# Patient Record
Sex: MALE | Race: OTHER | NOT HISPANIC OR LATINO | URBAN - METROPOLITAN AREA
[De-identification: names, ages, dates, MRNs, and addresses within clinical notes are randomized per-mention and may not be internally consistent; named-entity substitution may affect disease eponyms.]

---

## 2021-12-04 ENCOUNTER — EMERGENCY (EMERGENCY)
Facility: HOSPITAL | Age: 61
LOS: 1 days | Discharge: HOME | End: 2021-12-04
Attending: STUDENT IN AN ORGANIZED HEALTH CARE EDUCATION/TRAINING PROGRAM
Payer: COMMERCIAL

## 2021-12-04 ENCOUNTER — TRANSCRIPTION ENCOUNTER (OUTPATIENT)
Age: 61
End: 2021-12-04

## 2021-12-04 VITALS
RESPIRATION RATE: 18 BRPM | DIASTOLIC BLOOD PRESSURE: 81 MMHG | TEMPERATURE: 103 F | SYSTOLIC BLOOD PRESSURE: 125 MMHG | HEART RATE: 95 BPM | OXYGEN SATURATION: 95 %

## 2021-12-04 LAB
ALBUMIN SERPL ELPH-MCNC: 3.7 G/DL — SIGNIFICANT CHANGE UP (ref 3.5–5)
ALP SERPL-CCNC: 41 U/L — SIGNIFICANT CHANGE UP (ref 40–120)
ALT FLD-CCNC: 23 U/L DA — SIGNIFICANT CHANGE UP (ref 10–60)
ANION GAP SERPL CALC-SCNC: 9 MMOL/L — SIGNIFICANT CHANGE UP (ref 5–17)
APTT BLD: 33.3 SEC — SIGNIFICANT CHANGE UP (ref 27.5–35.5)
AST SERPL-CCNC: 19 U/L — SIGNIFICANT CHANGE UP (ref 10–40)
BASOPHILS # BLD AUTO: 0.03 K/UL — SIGNIFICANT CHANGE UP (ref 0–0.2)
BASOPHILS NFR BLD AUTO: 0.2 % — SIGNIFICANT CHANGE UP (ref 0–2)
BILIRUB SERPL-MCNC: 0.9 MG/DL — SIGNIFICANT CHANGE UP (ref 0.2–1.2)
BUN SERPL-MCNC: 22 MG/DL — HIGH (ref 7–18)
CALCIUM SERPL-MCNC: 9 MG/DL — SIGNIFICANT CHANGE UP (ref 8.4–10.5)
CHLORIDE SERPL-SCNC: 100 MMOL/L — SIGNIFICANT CHANGE UP (ref 96–108)
CO2 SERPL-SCNC: 26 MMOL/L — SIGNIFICANT CHANGE UP (ref 22–31)
CREAT SERPL-MCNC: 1.14 MG/DL — SIGNIFICANT CHANGE UP (ref 0.5–1.3)
EOSINOPHIL # BLD AUTO: 0.05 K/UL — SIGNIFICANT CHANGE UP (ref 0–0.5)
EOSINOPHIL NFR BLD AUTO: 0.3 % — SIGNIFICANT CHANGE UP (ref 0–6)
FLUAV H1 2009 PAND RNA SPEC QL NAA+PROBE: DETECTED
GLUCOSE SERPL-MCNC: 139 MG/DL — HIGH (ref 70–99)
HCT VFR BLD CALC: 46.1 % — SIGNIFICANT CHANGE UP (ref 39–50)
HGB BLD-MCNC: 15.7 G/DL — SIGNIFICANT CHANGE UP (ref 13–17)
IMM GRANULOCYTES NFR BLD AUTO: 0.4 % — SIGNIFICANT CHANGE UP (ref 0–1.5)
INR BLD: 1.2 RATIO — HIGH (ref 0.88–1.16)
LACTATE SERPL-SCNC: 1.2 MMOL/L — SIGNIFICANT CHANGE UP (ref 0.7–2)
LYMPHOCYTES # BLD AUTO: 0.98 K/UL — LOW (ref 1–3.3)
LYMPHOCYTES # BLD AUTO: 6.5 % — LOW (ref 13–44)
MCHC RBC-ENTMCNC: 30.5 PG — SIGNIFICANT CHANGE UP (ref 27–34)
MCHC RBC-ENTMCNC: 34.1 GM/DL — SIGNIFICANT CHANGE UP (ref 32–36)
MCV RBC AUTO: 89.5 FL — SIGNIFICANT CHANGE UP (ref 80–100)
MONOCYTES # BLD AUTO: 1.24 K/UL — HIGH (ref 0–0.9)
MONOCYTES NFR BLD AUTO: 8.2 % — SIGNIFICANT CHANGE UP (ref 2–14)
NEUTROPHILS # BLD AUTO: 12.79 K/UL — HIGH (ref 1.8–7.4)
NEUTROPHILS NFR BLD AUTO: 84.4 % — HIGH (ref 43–77)
NRBC # BLD: 0 /100 WBCS — SIGNIFICANT CHANGE UP (ref 0–0)
PLATELET # BLD AUTO: 188 K/UL — SIGNIFICANT CHANGE UP (ref 150–400)
POTASSIUM SERPL-MCNC: 3.7 MMOL/L — SIGNIFICANT CHANGE UP (ref 3.5–5.3)
POTASSIUM SERPL-SCNC: 3.7 MMOL/L — SIGNIFICANT CHANGE UP (ref 3.5–5.3)
PROT SERPL-MCNC: 7.5 G/DL — SIGNIFICANT CHANGE UP (ref 6–8.3)
PROTHROM AB SERPL-ACNC: 14.2 SEC — HIGH (ref 10.6–13.6)
RAPID RVP RESULT: DETECTED
RBC # BLD: 5.15 M/UL — SIGNIFICANT CHANGE UP (ref 4.2–5.8)
RBC # FLD: 12.2 % — SIGNIFICANT CHANGE UP (ref 10.3–14.5)
SARS-COV-2 RNA SPEC QL NAA+PROBE: SIGNIFICANT CHANGE UP
SODIUM SERPL-SCNC: 135 MMOL/L — SIGNIFICANT CHANGE UP (ref 135–145)
WBC # BLD: 15.15 K/UL — HIGH (ref 3.8–10.5)
WBC # FLD AUTO: 15.15 K/UL — HIGH (ref 3.8–10.5)

## 2021-12-04 PROCEDURE — 85025 COMPLETE CBC W/AUTO DIFF WBC: CPT

## 2021-12-04 PROCEDURE — 99284 EMERGENCY DEPT VISIT MOD MDM: CPT | Mod: 25

## 2021-12-04 PROCEDURE — 85730 THROMBOPLASTIN TIME PARTIAL: CPT

## 2021-12-04 PROCEDURE — 71045 X-RAY EXAM CHEST 1 VIEW: CPT

## 2021-12-04 PROCEDURE — 71045 X-RAY EXAM CHEST 1 VIEW: CPT | Mod: 26

## 2021-12-04 PROCEDURE — 85610 PROTHROMBIN TIME: CPT

## 2021-12-04 PROCEDURE — 80053 COMPREHEN METABOLIC PANEL: CPT

## 2021-12-04 PROCEDURE — 0225U NFCT DS DNA&RNA 21 SARSCOV2: CPT

## 2021-12-04 PROCEDURE — 36415 COLL VENOUS BLD VENIPUNCTURE: CPT

## 2021-12-04 PROCEDURE — 96374 THER/PROPH/DIAG INJ IV PUSH: CPT

## 2021-12-04 PROCEDURE — 99285 EMERGENCY DEPT VISIT HI MDM: CPT

## 2021-12-04 PROCEDURE — 87040 BLOOD CULTURE FOR BACTERIA: CPT

## 2021-12-04 PROCEDURE — 83605 ASSAY OF LACTIC ACID: CPT

## 2021-12-04 RX ORDER — ACETAMINOPHEN 500 MG
650 TABLET ORAL ONCE
Refills: 0 | Status: COMPLETED | OUTPATIENT
Start: 2021-12-04 | End: 2021-12-04

## 2021-12-04 RX ORDER — SODIUM CHLORIDE 9 MG/ML
1000 INJECTION INTRAMUSCULAR; INTRAVENOUS; SUBCUTANEOUS ONCE
Refills: 0 | Status: COMPLETED | OUTPATIENT
Start: 2021-12-04 | End: 2021-12-04

## 2021-12-04 RX ADMIN — Medication 650 MILLIGRAM(S): at 21:22

## 2021-12-04 RX ADMIN — SODIUM CHLORIDE 1000 MILLILITER(S): 9 INJECTION INTRAMUSCULAR; INTRAVENOUS; SUBCUTANEOUS at 21:23

## 2021-12-04 NOTE — ED PROVIDER NOTE - NS ED ROS FT
+ fevers, chills  + malaise, body aches  (-) neck pain, stiffness  (-) cp, palpitations  (-) sob, cough, hemoptysis  (-) abd pain, n/v/d/c   (-) urinary sxs, back pain  (-) weakness, paresthesias

## 2021-12-04 NOTE — ED PROVIDER NOTE - PATIENT PORTAL LINK FT
You can access the FollowMyHealth Patient Portal offered by [facility] by registering at the following website: [email or web address]. By joining [facility]’s FollowMyHealth portal, you will also be able to view your health information using other applications (apps) compatible with our system.

## 2021-12-04 NOTE — ED PROVIDER NOTE - OBJECTIVE STATEMENT
60yo M p/w nasal congestion, headache, fatigue, body aches for past week. patient states that his son was ill with similar symptoms 1 week ago and patient began to ecxperience similar sxs the following day. patient states that he went to urgent care 4 days ago and had negative covid test. patient states that sxs have persisted longer than he would have thought for a cold, so he came to the ed. denies syncope, cp, sob, abd pain, back pain, n/v/d/c otherwise. Vaccinated against covid.

## 2021-12-04 NOTE — ED PROVIDER NOTE - PROGRESS NOTE DETAILS
influenza positive  more than 5 days out therefore no indication tamiflu at this time  pt feels improved after meds, ivf  dc w hydration encouragement, pcp f/up in 1 week. education and expectation management given.

## 2021-12-04 NOTE — ED ADULT TRIAGE NOTE - CHIEF COMPLAINT QUOTE
He has shortness of breath, fever, headache, generalized weakness.  Had negative COVID PCR, flu tests done today

## 2021-12-04 NOTE — ED PROVIDER NOTE - CLINICAL SUMMARY MEDICAL DECISION MAKING FREE TEXT BOX
pt p/w fatigue, body aches, f/c  sepsis protocol in ed  high siuspicion viral etiology as pt had sick contacts w same sxs at home and myriad of body sxs generally a/w viral etiology  labs, ivf, xr, rvp, reassess

## 2021-12-04 NOTE — ED PROVIDER NOTE - PHYSICAL EXAMINATION
Sohrawardy:   VS reviewed  aaoxx3  rrr, s1s2, no jvd, no pitting edema  normal resp effort  lungs ctab, no w/r/r  abdomen soft, nd/nt   no neuro deficits

## 2021-12-10 LAB
CULTURE RESULTS: SIGNIFICANT CHANGE UP
CULTURE RESULTS: SIGNIFICANT CHANGE UP
SPECIMEN SOURCE: SIGNIFICANT CHANGE UP
SPECIMEN SOURCE: SIGNIFICANT CHANGE UP

## 2022-11-21 PROBLEM — Z00.00 ENCOUNTER FOR PREVENTIVE HEALTH EXAMINATION: Status: ACTIVE | Noted: 2022-11-21

## 2022-11-30 ENCOUNTER — APPOINTMENT (OUTPATIENT)
Dept: UROLOGY | Facility: CLINIC | Age: 62
End: 2022-11-30

## 2022-11-30 VITALS
DIASTOLIC BLOOD PRESSURE: 87 MMHG | OXYGEN SATURATION: 99 % | HEIGHT: 66 IN | WEIGHT: 189 LBS | BODY MASS INDEX: 30.37 KG/M2 | SYSTOLIC BLOOD PRESSURE: 132 MMHG | HEART RATE: 70 BPM | TEMPERATURE: 97.8 F

## 2022-11-30 DIAGNOSIS — Z87.891 PERSONAL HISTORY OF NICOTINE DEPENDENCE: ICD-10-CM

## 2022-11-30 PROCEDURE — 99204 OFFICE O/P NEW MOD 45 MIN: CPT

## 2022-11-30 RX ORDER — METFORMIN HYDROCHLORIDE 500 MG/1
500 TABLET, COATED ORAL
Qty: 60 | Refills: 0 | Status: ACTIVE | COMMUNITY
Start: 2022-09-20

## 2022-11-30 RX ORDER — GLYBURIDE AND METFORMIN HYDROCHLORIDE 2.5; 5 MG/1; MG/1
2.5-5 TABLET, FILM COATED ORAL
Qty: 60 | Refills: 0 | Status: ACTIVE | COMMUNITY
Start: 2022-11-26

## 2022-11-30 RX ORDER — METHYLPREDNISOLONE 4 MG/1
4 TABLET ORAL
Qty: 21 | Refills: 0 | Status: ACTIVE | COMMUNITY
Start: 2022-06-22

## 2022-11-30 RX ORDER — HYDROCORTISONE 25 MG/G
2.5 CREAM TOPICAL
Qty: 30 | Refills: 0 | Status: ACTIVE | COMMUNITY
Start: 2022-09-20

## 2022-11-30 NOTE — HISTORY OF PRESENT ILLNESS
[Currently Experiencing ___] :  [unfilled] [Erectile Dysfunction] : Erectile Dysfunction [None] : None [FreeTextEntry1] : Mr. Duran is a very pleasant 62 year old man here today for ED.\par He reports that he is able to get an erection but it looses strength rapidly.\par Reports he has a desire for intercourse; reports some anxiety.\par Reports that he has tried Viagra before with success.\par Reports DM; last HgbA1c 8%.\par Denies any cardiovascular disease or chest pain.\par Denies any hematuria, dysuria or urinary retention.\par Denies any family history of urological cancers.\par Former smoker; quit smoking 30 years ago.\par

## 2022-11-30 NOTE — ASSESSMENT
[FreeTextEntry1] : Mr. Duran is a very pleasant 62 year old man here today for ED.\par \par -We had an extensive discussion regarding possible management options for erectile dysfunction, including PDE 5 inhibitors, SID, ICI, intraurethral alprostadil, penile prosthesis\par -After a thorough discussion of the risks and benefits of the aforementioned options he would like to try a trial of a PDE 5 inhibitor\par -Trial of Cialis\par -Discussed the R/B/A of Cialis at length, including priapism\par -A1c\par -BMP\par -PSA\par -F/U in 1 month

## 2022-12-01 LAB
ANION GAP SERPL CALC-SCNC: 10 MMOL/L
BUN SERPL-MCNC: 18 MG/DL
CALCIUM SERPL-MCNC: 9.9 MG/DL
CHLORIDE SERPL-SCNC: 91 MMOL/L
CO2 SERPL-SCNC: 29 MMOL/L
CREAT SERPL-MCNC: 1.02 MG/DL
EGFR: 82 ML/MIN/1.73M2
ESTIMATED AVERAGE GLUCOSE: 163 MG/DL
GLUCOSE SERPL-MCNC: 182 MG/DL
HBA1C MFR BLD HPLC: 7.3 %
POTASSIUM SERPL-SCNC: 4.5 MMOL/L
PSA SERPL-MCNC: 0.74 NG/ML
SODIUM SERPL-SCNC: 130 MMOL/L

## 2023-01-03 ENCOUNTER — APPOINTMENT (OUTPATIENT)
Dept: UROLOGY | Facility: CLINIC | Age: 63
End: 2023-01-03

## 2023-03-15 ENCOUNTER — APPOINTMENT (OUTPATIENT)
Dept: UROLOGY | Facility: CLINIC | Age: 63
End: 2023-03-15
Payer: COMMERCIAL

## 2023-03-15 VITALS
HEIGHT: 66 IN | BODY MASS INDEX: 29.73 KG/M2 | SYSTOLIC BLOOD PRESSURE: 134 MMHG | OXYGEN SATURATION: 97 % | TEMPERATURE: 97.4 F | DIASTOLIC BLOOD PRESSURE: 85 MMHG | HEART RATE: 87 BPM | WEIGHT: 185 LBS

## 2023-03-15 DIAGNOSIS — N52.9 MALE ERECTILE DYSFUNCTION, UNSPECIFIED: ICD-10-CM

## 2023-03-15 DIAGNOSIS — Z12.5 ENCOUNTER FOR SCREENING FOR MALIGNANT NEOPLASM OF PROSTATE: ICD-10-CM

## 2023-03-15 PROCEDURE — 99214 OFFICE O/P EST MOD 30 MIN: CPT

## 2023-03-15 RX ORDER — TADALAFIL 5 MG/1
5 TABLET ORAL
Qty: 20 | Refills: 0 | Status: DISCONTINUED | COMMUNITY
Start: 2022-11-30 | End: 2023-03-15

## 2023-03-15 RX ORDER — TADALAFIL 10 MG/1
10 TABLET, FILM COATED ORAL
Qty: 30 | Refills: 0 | Status: ACTIVE | COMMUNITY
Start: 2023-03-15 | End: 1900-01-01

## 2023-03-15 NOTE — ASSESSMENT
[FreeTextEntry1] : Very pleasant 63-year-old gentleman who presents for follow-up of BPH, screening for prostate cancer\par -PSA 0.74\par -Creatinine 1.02\par -A1c 7.3%.  We discussed how this may contribute to erectile dysfunction\par -Increase dose of Cialis to 10 mg; prescription sent to the pharmacy\par -I discussed the risks, benefits, alternatives, and possible side effects of Cialis (tadalafil) therapy with the patient, including but not limited to headache, flushing, upset stomach, blurry vision, change in color vision, vision loss, and priapism with the patient.\par -Follow-up in 6 months or sooner if necessary\par

## 2023-03-15 NOTE — HISTORY OF PRESENT ILLNESS
[FreeTextEntry1] : Very pleasant 63-year-old gentleman who presents for follow-up of erectile dysfunction, screening for prostate cancer.  He feels well.  He reports that Cialis 5 mg improves his erections, however he wishes to try a stronger dose of the med patient is he reports a loss of rigidity and tumescence while having sexual intercourse.  He denies dysuria.  No hematuria.  No side effects from the medication.

## 2023-03-27 NOTE — ED PROVIDER NOTE - WR ORDER NAME 1
Xray Chest 1 View- PORTABLE-Urgent [place] Pregnancy And Lactation Text: This medication is not safe during pregnancy. It is unknown if this medication is excreted in breast milk.

## 2024-05-08 ENCOUNTER — APPOINTMENT (OUTPATIENT)
Dept: ORTHOPEDIC SURGERY | Facility: CLINIC | Age: 64
End: 2024-05-08
Payer: COMMERCIAL

## 2024-05-08 DIAGNOSIS — Z86.39 PERSONAL HISTORY OF OTHER ENDOCRINE, NUTRITIONAL AND METABOLIC DISEASE: ICD-10-CM

## 2024-05-08 PROCEDURE — 99204 OFFICE O/P NEW MOD 45 MIN: CPT

## 2024-05-08 PROCEDURE — 72110 X-RAY EXAM L-2 SPINE 4/>VWS: CPT

## 2024-05-08 RX ORDER — CYCLOBENZAPRINE HYDROCHLORIDE 5 MG/1
5 TABLET, FILM COATED ORAL 3 TIMES DAILY
Qty: 30 | Refills: 1 | Status: ACTIVE | COMMUNITY
Start: 2024-05-08 | End: 1900-01-01

## 2024-05-08 RX ORDER — MELOXICAM 15 MG/1
15 TABLET ORAL
Qty: 30 | Refills: 1 | Status: ACTIVE | COMMUNITY
Start: 2024-05-08 | End: 1900-01-01

## 2024-05-16 ENCOUNTER — APPOINTMENT (OUTPATIENT)
Dept: ORTHOPEDIC SURGERY | Facility: CLINIC | Age: 64
End: 2024-05-16
Payer: COMMERCIAL

## 2024-05-16 ENCOUNTER — TRANSCRIPTION ENCOUNTER (OUTPATIENT)
Age: 64
End: 2024-05-16

## 2024-05-16 VITALS — HEIGHT: 66 IN | WEIGHT: 185 LBS | BODY MASS INDEX: 29.73 KG/M2

## 2024-05-16 DIAGNOSIS — M17.11 UNILATERAL PRIMARY OSTEOARTHRITIS, RIGHT KNEE: ICD-10-CM

## 2024-05-16 PROCEDURE — 20610 DRAIN/INJ JOINT/BURSA W/O US: CPT | Mod: RT

## 2024-05-16 PROCEDURE — 73562 X-RAY EXAM OF KNEE 3: CPT | Mod: RT

## 2024-05-16 PROCEDURE — 99213 OFFICE O/P EST LOW 20 MIN: CPT | Mod: 25

## 2024-05-16 NOTE — PHYSICAL EXAM
[de-identified] : Right knee  Constitutional:  The patient is healthy-appearing and in no apparent distress.   Gait: The patient ambulates with a normal gait and no limp.  Cardiovascular System:  The capillary refill is less than 2 seconds.   Skin:  There are no skin abnormalities.  Right Knee:   Bony Palpation:  There is tenderness of the medial joint line.  There is no tenderness of the lateral joint line. There is tenderness of the medial femoral chondyle. There is no tenderness of the lateral femoral chondyle. There is no tenderness of the tibial tubercle. There is no tenderness of the superior patella. There is no tenderness of the inferior patella. There is tenderness of the medial patellar facet. There is tenderness of the lateral patellar facet.  Soft Tissue Palpation:  There is no tenderness of the medial retinaculum. There is no tenderness of the lateral retinaculum. There is no tenderness of the quadriceps tendon. There is no tenderness of the patella tendon. There is no tenderness of the ITB. There is no tenderness of the pes anserine.  Active Range of Motion:  The range of motion at the knee actively and passively is full.   Special Tests:  There is a negative Apley. There is a negative Steinmanns.  There is a negative Lachman and Anterior Drawer. There is a negative Posterior Drawer.   There is no varus or valgus laxity.  Strength:  There is 5/5 hip flexion and 5/5 knee flexion and extension.    Psychiatric:  The patient demonstrates a normal mood and affect and is active and alert   [de-identified] : X-ray right knee: There is moderate tricompartmental arthritis

## 2024-05-16 NOTE — ASSESSMENT
[FreeTextEntry1] : Discussed at length with patient exam history and imaging and underlying arthritis he elects cortisone injection today as well as home exercises and declines any formal physical therapy
4

## 2024-05-16 NOTE — HISTORY OF PRESENT ILLNESS
[de-identified] : Initial visit: Right  knee pain  Reason: possible torn meniscus  -  from 2007 Duration:  unsure  Prior studies: x rays  Symptoms: sharp pain  Aggravating: up and down latter  Alleviating: massage  Pain level: 4/10 Pain medication: topical cream and rubs Medical Hx: Diabetes  Surgical Hx: n/a Current Medication: glyburide  Allergies: Amoxicillin  Physical therapy - finished several series of   physical therapy

## 2024-05-16 NOTE — PROCEDURE

## 2024-05-18 NOTE — HISTORY OF PRESENT ILLNESS
[de-identified] : 63 y/o male presenting with lower back pain x 3 years. Patient's pain has been intermittent but is recently worsening. Pain is localized to lower back and does not radiate down his legs. He has done PT with some relief. He had an MRI in 2021 and an epidural injection which provided excellent relief. He would like another injection.  denies radicular pain, recent illness, fevers, numbness, weakness, balance problems, saddle anesthesia, urinary retention or fecal incontinence.

## 2024-05-18 NOTE — PHYSICAL EXAM
[de-identified] :  General: No acute distress, conversant, well-nourished. Head: Normocephalic, atraumatic Neck: trachea midline, FROM Heart: normotensive and normal rate and rhythm Lungs: No labored breathing Skin: No abrasions, no rashes, no edema Psych: Alert and oriented to person, place and time Extremities: no peripheral edema or digital cyanosis Gait: Normal gait. Can perform tandem gait.  Vascular: warm and well perfused distally, palpable distal pulses    MSK: Lumbar spine: + tenderness to palpation.  No step-off, no deformity.   NEURO EXAM: Sensation Left L2  -  2/2            Left L3  -  2/2 Left L4  -  2/2 Left L5  -  2/2 Left S1  -  2/2   Right L2  -  2/2            Right L3  -  2/2 Right L4  -  2/2 Right L5  -  2/2 Right S1  -  2/2   Motor: Left L2 (hip flexion)                            5/5                Left L3 (knee extension)                   5/5                Left L4 (ankle dorsiflexion)                 5/5                Left L5 (long toe extensor)                5/5                Left S1 (ankle plantar flexion)           5/5   Right L2 (hip flexion)                            5/5                Right L3 (knee extension)                   5/5                Right L4 (ankle dorsiflexion)                 5/5                Right L5 (long toe extensor)                5/5                Right S1 (ankle plantar flexion)           5/5   Reflexes: Normal and symmetric Negative clonus.  Down-going Babinski. [de-identified] : I ordered radiographs to evaluate the patient's symptoms.  Lumbar 4 view radiographs taken in the office today show no dislocation or fracture.  Lumbar spondylosis.  No instability on dynamic series. Mild scoliosis.

## 2024-05-18 NOTE — ASSESSMENT
[FreeTextEntry1] : 65 y/o male presenting with lower back pain x 3 years. Patient's pain has been intermittent but is recently worsening. Pain is localized to lower back and does not radiate down his legs. He has done PT with some relief. He had an MRI in 2021 and an epidural injection which provided excellent relief. He would like another injection.  denies radicular pain, recent illness, fevers, numbness, weakness, balance problems, saddle anesthesia, urinary retention or fecal incontinence.  Patient will be sent for a MRI. The patient was given a referral for physical therapy. Start meloxicam. Start cyclobenzaprine. Follow up after MRI. We discussed red flag symptoms that would require emergent evaluation.  He knows to call with any questions or concerns or if his symptoms acutely worsen.

## 2024-05-30 ENCOUNTER — APPOINTMENT (OUTPATIENT)
Dept: PHYSICAL MEDICINE AND REHAB | Facility: CLINIC | Age: 64
End: 2024-05-30
Payer: COMMERCIAL

## 2024-05-30 ENCOUNTER — APPOINTMENT (OUTPATIENT)
Dept: ORTHOPEDIC SURGERY | Facility: CLINIC | Age: 64
End: 2024-05-30
Payer: COMMERCIAL

## 2024-05-30 DIAGNOSIS — M54.50 LOW BACK PAIN, UNSPECIFIED: ICD-10-CM

## 2024-05-30 PROCEDURE — 99203 OFFICE O/P NEW LOW 30 MIN: CPT

## 2024-05-30 PROCEDURE — 99214 OFFICE O/P EST MOD 30 MIN: CPT

## 2024-05-30 NOTE — HISTORY OF PRESENT ILLNESS
[de-identified] : 63 y/o male presenting with lower back pain x 3 years. Patient's pain has been intermittent but is recently worsening. Pain is localized to lower back and does not radiate down his legs. He has done PT with some relief. He had an MRI in 2021 and an epidural injection which provided excellent relief. He would like another injection. denies radicular pain, recent illness, fevers, numbness, weakness, balance problems, saddle anesthesia, urinary retention or fecal incontinence. Patient following up today to review lumbar MRI. His pain remains the same.

## 2024-05-30 NOTE — ASSESSMENT
[FreeTextEntry1] : 63 y/o male presenting with lower back pain x 3 years. Patient's pain has been intermittent but is recently worsening. Pain is localized to lower back and does not radiate down his legs. He has done PT with some relief. He had an MRI in 2021 and an epidural injection which provided excellent relief. He would like another injection. denies radicular pain, recent illness, fevers, numbness, weakness, balance problems, saddle anesthesia, urinary retention or fecal incontinence. Patient following up today to review lumbar MRI. His pain remains the same. I independently reviewed lumbar MRI which shows L4-L5 disc herniation contributing to spinal canal stenosis. The patient was given a referral for consideration for spinal injections. Continue PT. Follow up in 3-4 weeks. We discussed red flag symptoms that would require emergent evaluation.

## 2024-05-30 NOTE — PHYSICAL EXAM
[de-identified] :  General: No acute distress, conversant, well-nourished. Head: Normocephalic, atraumatic Neck: trachea midline, FROM Heart: normotensive and normal rate and rhythm Lungs: No labored breathing Skin: No abrasions, no rashes, no edema Psych: Alert and oriented to person, place and time Extremities: no peripheral edema or digital cyanosis Gait: Normal gait. Can perform tandem gait.  Vascular: warm and well perfused distally, palpable distal pulses    MSK: Lumbar spine: + tenderness to palpation.  No step-off, no deformity.   NEURO EXAM: Sensation Left L2  -  2/2            Left L3  -  2/2 Left L4  -  2/2 Left L5  -  2/2 Left S1  -  2/2   Right L2  -  2/2            Right L3  -  2/2 Right L4  -  2/2 Right L5  -  2/2 Right S1  -  2/2   Motor: Left L2 (hip flexion)                            5/5                Left L3 (knee extension)                   5/5                Left L4 (ankle dorsiflexion)                 5/5                Left L5 (long toe extensor)                5/5                Left S1 (ankle plantar flexion)           5/5   Right L2 (hip flexion)                            5/5                Right L3 (knee extension)                   5/5                Right L4 (ankle dorsiflexion)                 5/5                Right L5 (long toe extensor)                5/5                Right S1 (ankle plantar flexion)           5/5   Reflexes: Normal and symmetric Negative clonus.  Down-going Babinski. [de-identified] : I independently reviewed MRI of the lumbar spine which showed degenerative changes including L4-L5 disc herniation with stenosis.    05/22/2024  L3-L4: Moderate diffuse disc bulging contributing to mild spinal canal stenosis and moderate foraminal stenosis bilaterally.  L4-L5: Inferiorly extruded central/right paracentral disc herniation superimposed on severe diffuse disc bulging contributing to severe spinal canal stenosis, impingement of the traversing L5 nerve roots in the lateral recesses bilaterally, and moderate to severe foraminal stenosis bilaterally.  L5-S1: Central disc protrusion superimposed on moderate diffuse disc bulging contributing to mild spinal canal stenosis, impingement of the traversing S1 nerve roots in the lateral recesses bilaterally, and moderate foraminal stenosis bilaterally.

## 2024-05-31 NOTE — ASSESSMENT
[FreeTextEntry1] : MRI shows L4/5 HNP causing severe stenosis, L5/S1 HNP pinching S1.  Discussed diagnosis and treatment plan including PT. He has tried conservative tx including PT, nsaids for past 3 months without relief. Discussed LISA in detail.  Risks include bleeding.  Stop nsaids 2 days before.   He will continue a comprehensive rehabilitation program afterward, as this is important to prevent recurrence of pain.

## 2024-05-31 NOTE — HISTORY OF PRESENT ILLNESS
[FreeTextEntry1] : Location: back Severity: 10/10 Duration: over 3 yr Context:  Aggravating Factors: getting up, walking Alleviating Factors: HEP, LISA 80% relief for 1 yr Associated Symptoms: denies weight loss, fever, chills, change in bowel/bladder habits, weakness, numbness/tingling, +radiation down right leg Prior Studies: MRI

## 2024-05-31 NOTE — PHYSICAL EXAM
[FreeTextEntry1] : GEN is a 64 year old male  Constitutional: healthy appearing, NAD, and normal body habitus  LUMBAR ROM: flexion to 30 deg, ext to 5 deg  Gait: normal  Inspection: no erythema, warmth Spine: no TTP in spinous process Bony palpation: no TTP in GT  Soft tissue palpation hip: no TTP in gluteus brianna Soft tissue palpation of spine: no TTP in lumbar paraspinals  5/5 bilateral KE, DF, PF  sensation intact in bilat LE   Special tests: + seated SLR

## 2024-06-28 ENCOUNTER — APPOINTMENT (OUTPATIENT)
Dept: PHYSICAL MEDICINE AND REHAB | Facility: CLINIC | Age: 64
End: 2024-06-28
Payer: COMMERCIAL

## 2024-06-28 DIAGNOSIS — M51.26 OTHER INTERVERTEBRAL DISC DISPLACEMENT, LUMBAR REGION: ICD-10-CM

## 2024-06-28 DIAGNOSIS — M48.062 SPINAL STENOSIS, LUMBAR REGION WITH NEUROGENIC CLAUDICATION: ICD-10-CM

## 2024-06-28 PROCEDURE — 64483 NJX AA&/STRD TFRM EPI L/S 1: CPT | Mod: RT

## 2024-06-28 PROCEDURE — 64484 NJX AA&/STRD TFRM EPI L/S EA: CPT | Mod: RT

## 2024-07-12 ENCOUNTER — APPOINTMENT (OUTPATIENT)
Dept: PHYSICAL MEDICINE AND REHAB | Facility: CLINIC | Age: 64
End: 2024-07-12
Payer: COMMERCIAL

## 2024-07-12 DIAGNOSIS — M48.062 SPINAL STENOSIS, LUMBAR REGION WITH NEUROGENIC CLAUDICATION: ICD-10-CM

## 2024-07-12 DIAGNOSIS — M51.26 OTHER INTERVERTEBRAL DISC DISPLACEMENT, LUMBAR REGION: ICD-10-CM

## 2024-07-12 PROCEDURE — 64483 NJX AA&/STRD TFRM EPI L/S 1: CPT | Mod: NC

## 2024-08-19 ENCOUNTER — RX RENEWAL (OUTPATIENT)
Age: 64
End: 2024-08-19

## 2024-08-20 ENCOUNTER — NON-APPOINTMENT (OUTPATIENT)
Age: 64
End: 2024-08-20

## 2024-08-20 ENCOUNTER — APPOINTMENT (OUTPATIENT)
Dept: ORTHOPEDIC SURGERY | Facility: CLINIC | Age: 64
End: 2024-08-20
Payer: COMMERCIAL

## 2024-08-20 DIAGNOSIS — M48.062 SPINAL STENOSIS, LUMBAR REGION WITH NEUROGENIC CLAUDICATION: ICD-10-CM

## 2024-08-20 DIAGNOSIS — M54.50 LOW BACK PAIN, UNSPECIFIED: ICD-10-CM

## 2024-08-20 PROCEDURE — 99214 OFFICE O/P EST MOD 30 MIN: CPT

## 2024-08-20 NOTE — ASSESSMENT
[FreeTextEntry1] : 65 y/o male followup with acute exacerbation of chronic low back pain.  The pain radiates to his hips. He denies recent illness, fevers, numbness, weakness, balance problems, saddle anesthesia, urinary retention or fecal incontinence.  He has tried PT, medications and multiple spinal injections without sustained relief.  He is interested in surgery, I independently reviewed MRI of the lumbar spine which showed degenerative changes including L4-L5 disc herniation with severe stenosis. We discussed treatment options including physical therapy, medications, spinal injections and surgery.  Surgery would be L4-L5 decompression. We discussed the risks and benefits of surgery. The risks include anesthesia, blood loss, need for blood transfusion, clots, stroke, myocardial infarct, chronic pain, loss of function, need for reoperation, durotomy, infection and damage to neurovascular structures.  He understands the risks. He asked several great questions all of which were answered. He will be scheduled for surgery.  We discussed red flag symptoms that would require emergent evaluation. He knows to call with any questions or concerns or if his symptoms acutely worsen.

## 2024-08-20 NOTE — HISTORY OF PRESENT ILLNESS
[de-identified] : 63 y/o male followup with acute exacerbation of chronic low back pain.  The pain radiates to his hips. He denies recent illness, fevers, numbness, weakness, balance problems, saddle anesthesia, urinary retention or fecal incontinence.  He has tried PT, medications and multiple spinal injections without sustained relief.  He is interested in surgery,

## 2024-08-20 NOTE — PHYSICAL EXAM
[de-identified] : General: No acute distress, conversant, well-nourished. Head: Normocephalic, atraumatic Neck: trachea midline, FROM Heart: normotensive and normal rate and rhythm Lungs: No labored breathing Skin: No abrasions, no rashes, no edema Psych: Alert and oriented to person, place and time Extremities: no peripheral edema or digital cyanosis Gait: Normal gait. Can perform tandem gait.   Vascular: warm and well perfused distally, palpable distal pulses MSK: Lumbar spine: No tenderness to palpation.  No step-off, no deformity.  NEURO EXAM: Sensation  Left L2  -  2/2             Left L3  -  2/2 Left L4  -  2/2 Left L5  -  2/2 Left S1  -  2/2  Right L2  -  2/2             Right L3  -  2/2 Right L4  -  2/2 Right L5  -  2/2 Right S1  -  2/2  Motor:  Left L2 (hip flexion)                            5/5                 Left L3 (knee extension)                   5/5                 Left L4 (ankle dorsiflexion)                 5/5                 Left L5 (long toe extensor)                5/5                 Left S1 (ankle plantar flexion)           5/5  Right L2 (hip flexion)                            5/5                 Right L3 (knee extension)                   5/5                 Right L4 (ankle dorsiflexion)                 5/5                 Right L5 (long toe extensor)                5/5                 Right S1 (ankle plantar flexion)           5/5  Reflexes: Normal and symmetric Negative clonus.  Down-going Babinski.    [de-identified] : I independently reviewed MRI of the lumbar spine which showed degenerative changes including L4-L5 disc herniation with stenosis.  05/22/2024  L3-L4: Moderate diffuse disc bulging contributing to mild spinal canal stenosis and moderate foraminal stenosis bilaterally.  L4-L5: Inferiorly extruded central/right paracentral disc herniation superimposed on severe diffuse disc bulging contributing to severe spinal canal stenosis, impingement of the traversing L5 nerve roots in the lateral recesses bilaterally, and moderate to severe foraminal stenosis bilaterally.  L5-S1: Central disc protrusion superimposed on moderate diffuse disc bulging contributing to mild spinal canal stenosis, impingement of the traversing S1 nerve roots in the lateral recesses bilaterally, and moderate foraminal stenosis bilaterally.

## 2024-08-23 ENCOUNTER — RESULT REVIEW (OUTPATIENT)
Age: 64
End: 2024-08-23

## 2024-08-23 ENCOUNTER — APPOINTMENT (OUTPATIENT)
Dept: ORTHOPEDIC SURGERY | Facility: HOSPITAL | Age: 64
End: 2024-08-23

## 2024-08-23 ENCOUNTER — TRANSCRIPTION ENCOUNTER (OUTPATIENT)
Age: 64
End: 2024-08-23

## 2024-08-27 PROBLEM — E11.9 TYPE 2 DIABETES MELLITUS WITHOUT COMPLICATIONS: Chronic | Status: ACTIVE | Noted: 2024-08-22

## 2024-08-27 PROBLEM — M48.00 SPINAL STENOSIS, SITE UNSPECIFIED: Chronic | Status: ACTIVE | Noted: 2024-08-22

## 2024-08-27 PROBLEM — E03.9 HYPOTHYROIDISM, UNSPECIFIED: Chronic | Status: ACTIVE | Noted: 2024-08-22

## 2024-09-02 ENCOUNTER — NON-APPOINTMENT (OUTPATIENT)
Age: 64
End: 2024-09-02

## 2024-09-06 ENCOUNTER — APPOINTMENT (OUTPATIENT)
Dept: ORTHOPEDIC SURGERY | Facility: CLINIC | Age: 64
End: 2024-09-06
Payer: COMMERCIAL

## 2024-09-06 DIAGNOSIS — M54.50 LOW BACK PAIN, UNSPECIFIED: ICD-10-CM

## 2024-09-06 PROCEDURE — 99024 POSTOP FOLLOW-UP VISIT: CPT

## 2024-09-08 NOTE — HISTORY OF PRESENT ILLNESS
[___ Weeks Post Op] : [unfilled] weeks post op [0] : no pain reported [Chills] : no chills [Constipation] : no constipation [Diarrhea] : no diarrhea [Dysuria] : no dysuria [Fever] : no fever [Nausea] : no nausea [Vomiting] : no vomiting [de-identified] : s/p L4-L5 Laminectomy (8/23/24) [de-identified] : 64 year old male s/p L4-L5 Laminectomy (8/23/24). He denies pain.  He denies radicular pain, recent illness, fevers, numbness, weakness, balance problems, saddle anesthesia, urinary retention or fecal incontinence. [de-identified] : MSK: Lumbar spine: incision well healed.    NEURO EXAM: Sensation  Left L2  -  2/2             Left L3  -  2/2 Left L4  -  2/2 Left L5  -  2/2 Left S1  -  2/2  Right L2  -  2/2             Right L3  -  2/2 Right L4  -  2/2 Right L5  -  2/2 Right S1  -  2/2  Motor:  Left L2 (hip flexion)                            5/5                 Left L3 (knee extension)                   5/5                 Left L4 (ankle dorsiflexion)                 5/5                 Left L5 (long toe extensor)                5/5                 Left S1 (ankle plantar flexion)           5/5  Right L2 (hip flexion)                            5/5                 Right L3 (knee extension)                   5/5                 Right L4 (ankle dorsiflexion)                 5/5                 Right L5 (long toe extensor)                5/5                 Right S1 (ankle plantar flexion)           5/5 [de-identified] : Continue no lifting > 10 lbs, twisting or bending.  F/U in 4 weeks. We discussed red flag symptoms that would require emergent evaluation.  He knows to call with any questions or concerns or if his symptoms acutely worsen.  [de-identified] : 64 year old male s/p L4-L5 Laminectomy (8/23/24).

## 2024-09-08 NOTE — HISTORY OF PRESENT ILLNESS
[___ Weeks Post Op] : [unfilled] weeks post op [0] : no pain reported [Chills] : no chills [Constipation] : no constipation [Diarrhea] : no diarrhea [Dysuria] : no dysuria [Fever] : no fever [Nausea] : no nausea [Vomiting] : no vomiting [de-identified] : s/p L4-L5 Laminectomy (8/23/24) [de-identified] : 64 year old male s/p L4-L5 Laminectomy (8/23/24). He denies pain.  He denies radicular pain, recent illness, fevers, numbness, weakness, balance problems, saddle anesthesia, urinary retention or fecal incontinence. [de-identified] : MSK: Lumbar spine: incision well healed.    NEURO EXAM: Sensation  Left L2  -  2/2             Left L3  -  2/2 Left L4  -  2/2 Left L5  -  2/2 Left S1  -  2/2  Right L2  -  2/2             Right L3  -  2/2 Right L4  -  2/2 Right L5  -  2/2 Right S1  -  2/2  Motor:  Left L2 (hip flexion)                            5/5                 Left L3 (knee extension)                   5/5                 Left L4 (ankle dorsiflexion)                 5/5                 Left L5 (long toe extensor)                5/5                 Left S1 (ankle plantar flexion)           5/5  Right L2 (hip flexion)                            5/5                 Right L3 (knee extension)                   5/5                 Right L4 (ankle dorsiflexion)                 5/5                 Right L5 (long toe extensor)                5/5                 Right S1 (ankle plantar flexion)           5/5 [de-identified] : Continue no lifting > 10 lbs, twisting or bending.  F/U in 4 weeks. We discussed red flag symptoms that would require emergent evaluation.  He knows to call with any questions or concerns or if his symptoms acutely worsen.  [de-identified] : 64 year old male s/p L4-L5 Laminectomy (8/23/24).

## 2024-09-18 RX ORDER — MELOXICAM 15 MG/1
15 TABLET ORAL
Qty: 30 | Refills: 1 | Status: ACTIVE | COMMUNITY
Start: 2024-09-18 | End: 1900-01-01

## 2024-09-18 RX ORDER — CYCLOBENZAPRINE HYDROCHLORIDE 5 MG/1
5 TABLET, FILM COATED ORAL 3 TIMES DAILY
Qty: 21 | Refills: 0 | Status: ACTIVE | COMMUNITY
Start: 2024-09-18 | End: 1900-01-01

## 2024-10-04 ENCOUNTER — APPOINTMENT (OUTPATIENT)
Dept: ORTHOPEDIC SURGERY | Facility: CLINIC | Age: 64
End: 2024-10-04
Payer: COMMERCIAL

## 2024-10-04 DIAGNOSIS — M54.50 LOW BACK PAIN, UNSPECIFIED: ICD-10-CM

## 2024-10-04 PROCEDURE — 99024 POSTOP FOLLOW-UP VISIT: CPT

## 2024-10-04 NOTE — HISTORY OF PRESENT ILLNESS
[0] : no pain reported [Chills] : no chills [Constipation] : no constipation [Diarrhea] : no diarrhea [Dysuria] : no dysuria [Fever] : no fever [Nausea] : no nausea [Vomiting] : no vomiting [de-identified] : s/p L4-L5 Laminectomy (8/23/24) [de-identified] : 64 year old male s/p L4-L5 Laminectomy (8/23/24).  He denies radicular pain, recent illness, fevers, numbness, weakness, balance problems, saddle anesthesia, urinary retention or fecal incontinence.  He reports discomfort with lifting. His job requires heavy lifting.   [de-identified] : MSK: Lumbar spine: incision well healed.    NEURO EXAM: Sensation  Left L2  -  2/2             Left L3  -  2/2 Left L4  -  2/2 Left L5  -  2/2 Left S1  -  2/2  Right L2  -  2/2             Right L3  -  2/2 Right L4  -  2/2 Right L5  -  2/2 Right S1  -  2/2  Motor:  Left L2 (hip flexion)                            5/5                 Left L3 (knee extension)                   5/5                 Left L4 (ankle dorsiflexion)                 5/5                 Left L5 (long toe extensor)                5/5                 Left S1 (ankle plantar flexion)           5/5  Right L2 (hip flexion)                            5/5                 Right L3 (knee extension)                   5/5                 Right L4 (ankle dorsiflexion)                 5/5                 Right L5 (long toe extensor)                5/5                 Right S1 (ankle plantar flexion)           5/5 [de-identified] : 64 year old male s/p L4-L5 Laminectomy (8/23/24).  [de-identified] : Advance activities as tolerated. The patient was given a referral for physical therapy.  He will do PT prior to returning to work.  F/U in 2 months, We discussed red flag symptoms that would require emergent evaluation.  He knows to call with any questions or concerns or if his symptoms acutely worsen.

## 2024-10-10 ENCOUNTER — NON-APPOINTMENT (OUTPATIENT)
Age: 64
End: 2024-10-10

## 2024-11-05 ENCOUNTER — NON-APPOINTMENT (OUTPATIENT)
Age: 64
End: 2024-11-05

## 2024-11-21 ENCOUNTER — NON-APPOINTMENT (OUTPATIENT)
Age: 64
End: 2024-11-21

## 2024-12-12 ENCOUNTER — APPOINTMENT (OUTPATIENT)
Dept: ORTHOPEDIC SURGERY | Facility: CLINIC | Age: 64
End: 2024-12-12

## 2024-12-12 PROCEDURE — 99214 OFFICE O/P EST MOD 30 MIN: CPT

## 2025-02-13 ENCOUNTER — APPOINTMENT (OUTPATIENT)
Dept: ORTHOPEDIC SURGERY | Facility: CLINIC | Age: 65
End: 2025-02-13

## 2025-02-13 DIAGNOSIS — M17.11 UNILATERAL PRIMARY OSTEOARTHRITIS, RIGHT KNEE: ICD-10-CM

## 2025-02-13 PROCEDURE — 99213 OFFICE O/P EST LOW 20 MIN: CPT | Mod: 25

## 2025-02-13 PROCEDURE — 20610 DRAIN/INJ JOINT/BURSA W/O US: CPT | Mod: RT

## 2025-02-23 NOTE — ED PROVIDER NOTE - DISCHARGE DATE
Cardiology Consult Note    [phone]  Fax: [phone]     Inpatient consult to Cardiology  Consult performed by: Joe Young MD  Consult ordered by: Georgi Montaño MD  Reason for consult: Elevated troponin             Reason for Consultation: Elevated troponin    History of Present Illness: Tim Carrasco is a 23 year old male with a history of seizure disorder who had a seizure at home and came in for further evaluation. He last had a seizure in December. As part of his workup, troponins were checked in the ER for unclear indication. This was found to be mildly elevated. He is fairly active and denies any prior or current chest pain, dyspnea, orthopnea, PND, palpitations, syncope, near syncope, or edema.     Scheduled Meds:   Current Facility-Administered Medications   Medication Dose Route Frequency Provider Last Rate Last Admin    sodium chloride 0.9 % injection 2 mL  2 mL Intracatheter 2 times per day Esequiel Harmon MD   2 mL at 02/23/25 0859    Potassium Standard Replacement Protocol (Levels 3.5 and lower)   Does not apply See Admin Instructions Esequiel Harmon MD        Magnesium Standard Replacement Protocol   Does not apply See Admin Instructions Esequiel Harmon MD        pantoprazole (PROTONIX) EC tablet 40 mg  40 mg Oral QAM AC Esequiel Harmon MD   40 mg at 02/22/25 1705    LORazepam (ATIVAN) injection 2 mg  2 mg Intramuscular Once Esequiel Harmon MD        levETIRAcetam (KEPPRA) tablet 1,500 mg  1,500 mg Oral 2 times per day Esequiel Harmon MD   1,500 mg at 02/23/25 0859     Continuous Infusions:   Current Facility-Administered Medications   Medication Dose Route Frequency Provider Last Rate Last Admin     PRN Meds:.  Current Facility-Administered Medications   Medication Dose Route Frequency Provider Last Rate Last Admin    sodium chloride 0.9 % injection 10 mL  10 mL Intravenous PRN Esequiel Harmon MD        sodium chloride (NORMAL SALINE) 0.9 % bolus 500 mL  500 mL Intravenous PRN  Esequiel Harmon MD        melatonin tablet 3 mg  3 mg Oral Nightly PRN Esequiel Harmon MD        polyethylene glycol (MIRALAX) packet 17 g  17 g Oral Daily PRN Esequiel Harmon MD        docusate sodium-sennosides (SENOKOT S) 50-8.6 MG 2 tablet  2 tablet Oral BID PRN Esequiel Harmon MD        bisacodyl (DULCOLAX) suppository 10 mg  10 mg Rectal Daily PRN Esequiel Harmon MD        ondansetron (ZOFRAN ODT) disintegrating tablet 4 mg  4 mg Oral Q12H PRN Esequiel Harmon MD        Or    ondansetron (ZOFRAN) injection 4 mg  4 mg Intravenous Q12H PRN Esequiel Harmon MD        acetaminophen (TYLENOL) tablet 650 mg  650 mg Oral Q4H PRN Esequiel Harmon MD        Or    acetaminophen (TYLENOL) suppository 650 mg  650 mg Rectal Q4H PRN Esequiel Harmon MD        LORazepam (ATIVAN) injection 1 mg  1 mg Intramuscular Q8H PRN Samantha Resendez MD        [Held by provider] haloperidol lactate (HALDOL) 5 MG/ML short-acting injection 5 mg  5 mg Intravenous Q6H PRN Esequiel Harmon MD          Medications Prior to Admission   Medication Sig Dispense Refill    hydrOXYzine (ATARAX) 50 MG tablet Take 50 mg by mouth 3 times daily as needed for Anxiety.      levETIRAcetam (KEPPRA) 750 MG tablet Take 2 tablets by mouth every 12 hours. 120 tablet 1       ALLERGIES:  No Known Allergies    Past Medical History:   Diagnosis Date    Anxiety     Been a while    Asthma (CMD)     Depressive disorder 2017    Epilepsy (CMD)      Past Surgical History:   Procedure Laterality Date    No past surgeries       Family History   Problem Relation Age of Onset    Asthma Mother     Depression Mother     Asthma Father     Hypertension Father     High blood pressure Father     Hyperlipidemia Father     Seizure Disorder Sister     Seizure Disorder Brother       reports that he has been smoking cigarettes. He has never used smokeless tobacco. He reports current alcohol use. He reports current drug use. Drug: Marijuana.     Cardiac Risk Factors:    Hypertension:  no  Diabetes: no  High Cholesterol: no  Tobacco: no    Family History: No premature CAD or sudden cardiac death      Review of Systems:  Review of Systems   Constitutional: Negative.    HENT: Negative.     Eyes: Negative.    Respiratory:  Negative for chest tightness and shortness of breath.    Cardiovascular:  Negative for chest pain, palpitations and leg swelling.   Gastrointestinal: Negative.    Endocrine: Negative.    Genitourinary: Negative.    Musculoskeletal: Negative.  Negative for gait problem and myalgias.   Skin: Negative.    Allergic/Immunologic: Negative.    Neurological:  Negative for dizziness, speech difficulty, light-headedness and headaches.   Hematological: Negative.    Psychiatric/Behavioral: Negative.     All other systems reviewed and are negative.      The remainder of review of systems is otherwise negative.    Vital signs:   Visit Vitals  /73 (BP Location: RUE - Right upper extremity, Patient Position: Supine)   Pulse 78   Temp 98.6 °F (37 °C) (Oral)   Resp 16   Ht 5' 11\" (1.803 m)   Wt 75.7 kg (166 lb 14.2 oz)   SpO2 98%   BMI 23.28 kg/m²     Temp (24hrs), Av.3 °F (36.8 °C), Min:97.9 °F (36.6 °C), Max:98.8 °F (37.1 °C)           Wt Readings from Last 3 Encounters:   25 75.7 kg (166 lb 14.2 oz)   24 77.5 kg (170 lb 13.7 oz)   10/09/23 72.6 kg (160 lb)       Intake/Output:   0700 -  0659  In: 370 [P.O.:120]  Out: -     Physical Exam:    Physical Exam  Constitutional:       Appearance: Normal appearance.   HENT:      Head: Normocephalic and atraumatic.      Nose: Nose normal.      Mouth/Throat:      Mouth: Mucous membranes are moist.      Pharynx: Oropharynx is clear.      Neck: Normal range of motion and neck supple.   Eyes:      Extraocular Movements: Extraocular movements intact.   Cardiovascular:      Rate and Rhythm: Normal rate and regular rhythm.      Pulses: Normal pulses.      Heart sounds: Normal heart sounds, S1 normal and S2 normal.      No gallop.  No S4 sounds.   Pulmonary:      Effort: Pulmonary effort is normal.      Breath sounds: Normal breath sounds.   Abdominal:      General: Bowel sounds are normal.      Palpations: Abdomen is soft.   Musculoskeletal:         General: Normal range of motion.   Skin:     General: Skin is warm.      Capillary Refill: Capillary refill takes less than 2 seconds.   Neurological:      General: No focal deficit present.      Mental Status: He is alert and oriented to person, place, and time. Mental status is at baseline.   Psychiatric:         Mood and Affect: Mood normal.           Data Review: I reviewed all the pertinent labs. These are the pertinent findings:  HEM:  Recent Labs   Lab 02/23/25  0709 02/22/25  1037   WBC 10.8 17.8*   HGB 15.5 16.3   HCT 46.8 49.0    263   MCV 88.0 87.3       Chem:  Recent Labs   Lab 02/23/25  0709 02/22/25  1037   CO2 28 25   BUN 7 5*   CREATININE 0.90 0.82   CALCIUM 9.2 8.9   AST 18 18       Coagulation:  No results found    Invalid input(s): \"PROTIME\"    Last Cardiac:  Cardiac Labs  No results found    Lipid Panel  Recent Labs   Lab 02/23/25  0709   CHOLESTEROL 200*   HDL 72   CALCLDL 120   TRIGLYCERIDE 39       Cardiographics:  I reviewed all the pertinent studies. These are the pertinent findings:    Echo:No results found for this or any previous visit.      CXR:LAST X-RAY:  === 02/22/25 ===    XR CHEST AP OR PA    - Narrative -  EXAMINATION: Chest single view study on   2/22/2025 11:24 AM    CLINICAL INDICATION: 23-year-old man presenting to the emergency room after  seizure    COMPARISON: 12/24/2024    FINDINGS:  A single AP upright portable view of the chest was obtained.  The heart is normal in size and contour.  The james and mediastinum are normal.  The lungs are clear.  The costophrenic angles are sharp.  Bones, joints, and soft tissues are unremarkable.    - Impression -  Normal single view of the chest, unchanged compared to the previous  examination 2 months  earlier    Electronically Signed by: JADA CHIN MD  Signed on: 2/22/2025 11:58 AM  Workstation ID: [id number]      === 12/24/24 ===    XR SHOULDER 3 VIEWS LEFT    - Narrative -  Left shoulder x-ray.    CLINICAL HISTORY: Seizure. Left shoulder trauma.    TECHNIQUE: 3 views of the left shoulder were performed and submitted for  review.    COMPARISON: None.    FINDINGS:  No acute fracture, dislocation or subluxation. No aggressive osseous lesion  or radiopaque foreign body.    - Impression -  No evidence of left shoulder fracture or dislocation.      Electronically Signed by: NASIR BECKER M.D.  Signed on: 12/24/2024 5:05 AM  Workstation ID: [id number]    ___________________________________________________________________________    XR CHEST AP OR PA    - Narrative -  Portable chest x-ray.    CLINICAL HISTORY: Seizure.    TECHNIQUE: A single portable frontal upright view of the chest was  performed.    COMPARISON: Chest x-ray from August 8, 2023.    FINDINGS:  No consolidation, pleural effusion or pneumothorax. Heart size is within  normal limits. No findings of pulmonary vascular congestion.    - Impression -  No acute cardiopulmonary findings.      Electronically Signed by: NASIR BECKER M.D.  Signed on: 12/24/2024 5:04 AM  Workstation ID: [id number]       Last EKG::    Encounter Date: 02/22/25   Electrocardiogram 12-Lead   Result Value    Ventricular Rate EKG/Min (BPM) 78    Atrial Rate (BPM) 78    LA-Interval (MSEC) 150    QRS-Interval (MSEC) 90    QT-Interval (MSEC) 378    QTc 430    P Axis (Degrees) 26    R Axis (Degrees) -53    T Axis (Degrees) 48    REPORT TEXT      Normal sinus rhythm  with sinus arrhythmia  Left anterior fascicular block  Abnormal ECG  When compared with ECG of  22-FEB-2025 10:46,  No significant change was found             ASSESSMENT     Patient Active Problem List    Diagnosis Date Noted    Elevated troponin 02/22/2025     Priority: Low    Cocaine abuse (CMD)  02/22/2025     Priority: Low    Seizure  (CMD) 12/24/2024     Priority: Low    Positive urine drug screen 12/24/2024     Priority: Low    Alcohol abuse 12/24/2024     Priority: Low    Asthma (CMD) 12/24/2024     Priority: Low    MICHELLE (acute kidney injury) (CMD) 12/24/2024     Priority: Low    Hyperglycemia 12/24/2024     Priority: Low    Post-ictal confusion 12/24/2024     Priority: Low    Anxiety and depression 12/24/2024     Priority: Low    Breakthrough seizure  (CMD) 06/20/2021     Priority: Low    Nausea and vomiting 06/20/2021     Priority: Low    Leukocytosis 06/20/2021     Priority: Low    Metabolic acidosis 06/20/2021     Priority: Low    Marijuana use 06/20/2021     Priority: Low    Alcohol use disorder, mild, abuse 06/20/2021     Priority: Low    Pneumothorax, traumatic 08/28/2019     Priority: Low    Chronic nausea 08/28/2019     Priority: Low    Gastroesophageal reflux disease 08/28/2019     Priority: Low    Mild intermittent asthma without complication (CMD) 05/07/2019     Priority: Low    Seizure disorder  (CMD) 05/07/2019     Priority: Low    Right leg pain 05/07/2019     Priority: Low       23 year old man with history of seizure disorder here after a seizure. Apparently noted to have recent cocaine use.     PLAN     Unclear why troponin was checked but elevation is mild and not consistent with any underlying coronary syndrome. May be related to cocaine use. ECG reviewed; no evidence of ischemia or infarction noted.   Ok to discharge patient home from cardiology standpoint.         Joe Young MD, [facility]  Interventional Cardiology  2/23/2025    04-Dec-2021